# Patient Record
Sex: FEMALE | Race: WHITE | NOT HISPANIC OR LATINO | Employment: FULL TIME | URBAN - METROPOLITAN AREA
[De-identification: names, ages, dates, MRNs, and addresses within clinical notes are randomized per-mention and may not be internally consistent; named-entity substitution may affect disease eponyms.]

---

## 2019-08-04 ENCOUNTER — HOSPITAL ENCOUNTER (EMERGENCY)
Facility: HOSPITAL | Age: 24
Discharge: HOME | End: 2019-08-04
Attending: EMERGENCY MEDICINE | Admitting: EMERGENCY MEDICINE
Payer: COMMERCIAL

## 2019-08-04 VITALS
HEART RATE: 75 BPM | OXYGEN SATURATION: 96 % | WEIGHT: 150 LBS | SYSTOLIC BLOOD PRESSURE: 122 MMHG | BODY MASS INDEX: 24.11 KG/M2 | HEIGHT: 66 IN | TEMPERATURE: 97.6 F | RESPIRATION RATE: 18 BRPM | DIASTOLIC BLOOD PRESSURE: 76 MMHG

## 2019-08-04 DIAGNOSIS — S61.412A LACERATION OF LEFT HAND WITHOUT FOREIGN BODY, INITIAL ENCOUNTER: Primary | ICD-10-CM

## 2019-08-04 PROCEDURE — 99283 EMERGENCY DEPT VISIT LOW MDM: CPT

## 2019-08-04 PROCEDURE — 99282 EMERGENCY DEPT VISIT SF MDM: CPT | Performed by: EMERGENCY MEDICINE

## 2019-08-04 PROCEDURE — 12001 RPR S/N/AX/GEN/TRNK 2.5CM/<: CPT | Performed by: EMERGENCY MEDICINE

## 2019-08-05 NOTE — ED PROVIDER NOTES
History  Chief Complaint   Patient presents with    Hand Injury     Pt cut her hand with glass while doing the dishes  Has a small laceration on top of hand  HPI    None       Past Medical History:   Diagnosis Date    Scoliosis        Past Surgical History:   Procedure Laterality Date    SPINAL FUSION         History reviewed  No pertinent family history  I have reviewed and agree with the history as documented  Social History     Tobacco Use    Smoking status: Never Smoker    Smokeless tobacco: Never Used   Substance Use Topics    Alcohol use: Yes     Comment: Socially    Drug use: Never        Review of Systems    Physical Exam  Physical Exam   Constitutional: She is oriented to person, place, and time  She appears well-developed and well-nourished  No distress  HENT:   Head: Normocephalic and atraumatic  Eyes: Pupils are equal, round, and reactive to light  Conjunctivae are normal    Neck: Normal range of motion  No tracheal deviation present  Cardiovascular: Normal rate, regular rhythm and intact distal pulses  Pulses:       Radial pulses are 2+ on the left side  Pulmonary/Chest: Effort normal  No respiratory distress  Musculoskeletal:        Left hand: She exhibits tenderness and laceration  She exhibits normal range of motion, no bony tenderness, normal capillary refill, no deformity and no swelling  Hands:  Neurological: She is alert and oriented to person, place, and time  GCS eye subscore is 4  GCS verbal subscore is 5  GCS motor subscore is 6  Skin: Skin is warm and dry  Psychiatric: She has a normal mood and affect  Her behavior is normal    Nursing note and vitals reviewed        Vital Signs  ED Triage Vitals [08/04/19 2253]   Temperature Pulse Respirations Blood Pressure SpO2   97 6 °F (36 4 °C) 72 18 122/76 97 %      Temp Source Heart Rate Source Patient Position - Orthostatic VS BP Location FiO2 (%)   Oral Monitor Sitting Right arm --      Pain Score       3 Vitals:    08/04/19 2253 08/04/19 2300   BP: 122/76 122/76   Pulse: 72 75   Patient Position - Orthostatic VS: Sitting Sitting         Visual Acuity      ED Medications  Medications - No data to display    Diagnostic Studies  Results Reviewed     None                 No orders to display              Procedures  Laceration repair  Date/Time: 8/4/2019 11:08 PM  Performed by: Lauren Artis MD  Authorized by: Lauren Artis MD   Consent: Verbal consent obtained  Risks and benefits: risks, benefits and alternatives were discussed  Consent given by: patient  Body area: upper extremity  Location details: left hand  Laceration length: 2 cm  Foreign bodies: no foreign bodies  Tendon involvement: none  Nerve involvement: none  Vascular damage: no      Procedure Details:  Irrigation solution: saline  Irrigation method: syringe  Amount of cleaning: standard  Debridement: none  Degree of undermining: none  Skin closure: glue and Steri-Strips  Approximation: close  Approximation difficulty: simple  Patient tolerance: Patient tolerated the procedure well with no immediate complications             ED Course                               MDM  Number of Diagnoses or Management Options  Laceration of left hand without foreign body, initial encounter: new and does not require workup  Diagnosis management comments: This is a 26-year-old right-hand-dominant female who presents here today with a laceration to her left hand  Just prior to arrival she was washing dishes, and tried to catch a glass that was falling  She states she caught it as it was breaking, and suffered a laceration to her hand  She denies any other injuries  She states her tetanus is up-to-date  She denies any underlying medical problems or blood thinning medications  She has been applying pressure which is help stop the bleeding  She has no other complaints  ROS: Otherwise negative, unless stated as above      She is well-appearing, in no acute distress  She has a 2 centimeter curved avulsion flap to the dorsum of the left hand which is currently hemostatic  There is no underlying foreign body  The flap of skin is very thin, which it difficult for repair with sutures  It was therefore repaired as above with history crawl and Steri-Strips  I discussed with the patient treatment of the laceration at home, follow-up, indications for return, and she expresses understanding with this plan  Disposition  Final diagnoses:   Laceration of left hand without foreign body, initial encounter     Time reflects when diagnosis was documented in both MDM as applicable and the Disposition within this note     Time User Action Codes Description Comment    8/4/2019 11:13 PM Tiffanie Sloan [M84 [id number]] Laceration of left hand without foreign body, initial encounter       ED Disposition     ED Disposition Condition Date/Time Comment    Discharge Good Sun Aug 4, 2019 11:13 PM Jossie Mann discharge to home/self care  Follow-up Information     Follow up With Specialties Details Why Contact Info    Barbara Villalobos MD Family Medicine Schedule an appointment as soon as possible for a visit  As needed, to follow up on your hand [address] [place] [id number] [phone]            Patient's Medications    No medications on file     No discharge procedures on file      ED Provider  Electronically Signed by           Lauren Artis MD  08/04/19 8807

## 2019-08-05 NOTE — DISCHARGE INSTRUCTIONS
It is okay to get your hand wet, but do not soak it until the glue falls off  If the bandages fall off before the glue does, you can reapply them  Follow up with her doctor as needed to make sure the wound is healing appropriately  You need to be seen if you develop any signs of infection, including significant redness, swelling, drainage of pus, or for any other concerns

## 2021-04-04 ENCOUNTER — HOSPITAL ENCOUNTER (EMERGENCY)
Facility: HOSPITAL | Age: 26
Discharge: HOME | End: 2021-04-04
Attending: EMERGENCY MEDICINE | Admitting: EMERGENCY MEDICINE
Payer: OTHER MISCELLANEOUS

## 2021-04-04 VITALS
RESPIRATION RATE: 19 BRPM | WEIGHT: 145 LBS | OXYGEN SATURATION: 99 % | TEMPERATURE: 98.6 F | BODY MASS INDEX: 22.76 KG/M2 | SYSTOLIC BLOOD PRESSURE: 115 MMHG | HEIGHT: 67 IN | DIASTOLIC BLOOD PRESSURE: 68 MMHG | HEART RATE: 69 BPM

## 2021-04-04 DIAGNOSIS — S61.412A LACERATION OF LEFT HAND WITHOUT FOREIGN BODY, INITIAL ENCOUNTER: Primary | ICD-10-CM

## 2021-04-04 PROCEDURE — 99284 EMERGENCY DEPT VISIT MOD MDM: CPT | Performed by: NURSE PRACTITIONER

## 2021-04-04 PROCEDURE — 99282 EMERGENCY DEPT VISIT SF MDM: CPT

## 2021-04-04 RX ORDER — NORGESTIMATE AND ETHINYL ESTRADIOL 7DAYSX3 LO
1 KIT ORAL DAILY
COMMUNITY
Start: 2020-09-01 | End: 2021-04-04

## 2021-04-04 RX ORDER — CEPHALEXIN 500 MG/1
500 CAPSULE ORAL EVERY 6 HOURS SCHEDULED
Qty: 40 CAPSULE | Refills: 0 | Status: SHIPPED | OUTPATIENT
Start: 2021-04-04 | End: 2021-04-14

## 2021-04-04 RX ORDER — NORETHINDRONE ACETATE AND ETHINYL ESTRADIOL 1MG-20(21)
KIT ORAL
COMMUNITY

## 2021-04-04 NOTE — Clinical Note
Rich Zambrano was seen and treated in our emergency department on 4/4/2021  Diagnosis:     Lesvia Jackson  is off the rest of the shift today  She may return on this date: If you have any questions or concerns, please don't hesitate to call        ANTONIO Ramos    ______________________________           _______________          _______________  Hospital Representative                              Date                                Time

## 2021-04-05 NOTE — ED PROVIDER NOTES
History  Chief Complaint   Patient presents with    Hand Laceration     pt  presents with left hand laceration  This is a 70-year-old female who presents here with a laceration to the thenar eminence of the left palm  Laceration occurred at work while cleaning a wine glass  Laceration is no longer bleeding  She reports that she cleaned at work with water  Reports that her tetanus shot is up-to-date  The wound edges are already well approximated  Will reinforce and dress  Prior to Admission Medications   Prescriptions Last Dose Informant Patient Reported? Taking?   norethindrone-ethinyl estradiol (Junel FE 1/20) 1-20 MG-MCG per tablet   Yes No   Sig: Take by mouth      Facility-Administered Medications: None       Past Medical History:   Diagnosis Date    Scoliosis        Past Surgical History:   Procedure Laterality Date    SPINAL FUSION         History reviewed  No pertinent family history  I have reviewed and agree with the history as documented  E-Cigarette/Vaping     E-Cigarette/Vaping Substances     Social History     Tobacco Use    Smoking status: Never Smoker    Smokeless tobacco: Never Used   Substance Use Topics    Alcohol use: Yes     Comment: Socially    Drug use: Never       Review of Systems   Constitutional: Negative for diaphoresis, fatigue and fever  HENT: Negative for congestion, ear pain, nosebleeds and sore throat  Eyes: Negative for photophobia, pain, discharge and visual disturbance  Respiratory: Negative for cough, choking, chest tightness, shortness of breath and wheezing  Cardiovascular: Negative for chest pain and palpitations  Gastrointestinal: Negative for abdominal distention, abdominal pain, diarrhea and vomiting  Genitourinary: Negative for dysuria, flank pain and frequency  Musculoskeletal: Negative for back pain, gait problem and joint swelling  Skin: Positive for wound  Negative for color change and rash     Neurological: Negative for dizziness, syncope and headaches  Psychiatric/Behavioral: Negative for behavioral problems and confusion  The patient is not nervous/anxious  All other systems reviewed and are negative  Physical Exam  Physical Exam  Vitals signs and nursing note reviewed  Constitutional:       General: She is not in acute distress  Appearance: She is well-developed  She is not ill-appearing or toxic-appearing  HENT:      Head: Normocephalic and atraumatic  Mouth/Throat:      Dentition: Normal dentition  Eyes:      General:         Right eye: No discharge  Left eye: No discharge  Neck:      Musculoskeletal: Normal range of motion and neck supple  Cardiovascular:      Rate and Rhythm: Normal rate and regular rhythm  Pulmonary:      Effort: Pulmonary effort is normal  No accessory muscle usage or respiratory distress  Abdominal:      General: There is no distension  Tenderness: There is no guarding  Musculoskeletal: Normal range of motion  Skin:     General: Skin is warm and dry  Comments: 1 cm laceration to the left thenar eminence area  Neurological:      Mental Status: She is alert and oriented to person, place, and time  Coordination: Coordination normal    Psychiatric:         Behavior: Behavior is cooperative           Vital Signs  ED Triage Vitals [04/04/21 2118]   Temperature Pulse Respirations Blood Pressure SpO2   98 6 °F (37 °C) 69 19 115/68 99 %      Temp Source Heart Rate Source Patient Position - Orthostatic VS BP Location FiO2 (%)   Tympanic Monitor Sitting Left arm --      Pain Score       6           Vitals:    04/04/21 2118   BP: 115/68   Pulse: 69   Patient Position - Orthostatic VS: Sitting         Visual Acuity      ED Medications  Medications - No data to display    Diagnostic Studies  Results Reviewed     None                 No orders to display              Procedures  Procedures         ED Course                             SBIRT 20yo+      Most Recent Value   SBIRT (22 yo +)   In order to provide better care to our patients, we are screening all of our patients for alcohol and drug use  Would it be okay to ask you these screening questions? Yes Filed at: 04/04/2021 2123   Initial Alcohol Screen: US AUDIT-C    1  How often do you have a drink containing alcohol?  0 Filed at: 04/04/2021 2123   2  How many drinks containing alcohol do you have on a typical day you are drinking? 0 Filed at: 04/04/2021 2123   3a  Male UNDER 65: How often do you have five or more drinks on one occasion? 0 Filed at: 04/04/2021 2123   3b  FEMALE Any Age, or MALE 65+: How often do you have 4 or more drinks on one occassion? 0 Filed at: 04/04/2021 2123   Audit-C Score  0 Filed at: 04/04/2021 2123   SYL: How many times in the past year have you    Used an illegal drug or used a prescription medication for non-medical reasons? Never Filed at: 04/04/2021 2123                    MDM  Number of Diagnoses or Management Options  Laceration of left hand without foreign body, initial encounter: new and requires workup  Diagnosis management comments: The wound was prepped with tincture of benzoin  Steri-Strips were applied x2  The area was wrapped with sterile gauze  Amount and/or Complexity of Data Reviewed  Independent visualization of images, tracings, or specimens: yes    Patient Progress  Patient progress: stable      Disposition  Final diagnoses:   Laceration of left hand without foreign body, initial encounter     Time reflects when diagnosis was documented in both MDM as applicable and the Disposition within this note     Time User Action Codes Description Comment    4/4/2021  9:31 PM Tiffani Mcdaniels Add [B40 [id number]] Laceration of left hand without foreign body, initial encounter       ED Disposition     ED Disposition Condition Date/Time Comment    Discharge Stable Sun Apr 4, 2021  9:31 PM Olya Cantrell discharge to home/self care              Follow-up Information Follow up With Specialties Details Why Contact Info Additional Information    [facility] Emergency Department Emergency Medicine  As needed [address] Emergency Department, [address] [place] [address], [facility]          Patient's Medications   Discharge Prescriptions    CEPHALEXIN (KEFLEX) 500 MG CAPSULE    Take 1 capsule (500 mg total) by mouth every 6 (six) hours for 10 days       Start Date: 4/4/2021  End Date: 4/14/2021       Order Dose: 500 mg       Quantity: 40 capsule    Refills: 0     No discharge procedures on file      PDMP Review     None          ED Provider  Electronically Signed by           ANTONIO Box  04/04/21 1338

## 2021-05-21 ENCOUNTER — APPOINTMENT (EMERGENCY)
Dept: RADIOLOGY | Facility: HOSPITAL | Age: 26
End: 2021-05-21
Payer: COMMERCIAL

## 2021-05-21 ENCOUNTER — HOSPITAL ENCOUNTER (EMERGENCY)
Facility: HOSPITAL | Age: 26
Discharge: HOME | End: 2021-05-21
Attending: EMERGENCY MEDICINE
Payer: COMMERCIAL

## 2021-05-21 VITALS
SYSTOLIC BLOOD PRESSURE: 103 MMHG | HEART RATE: 58 BPM | TEMPERATURE: 98.2 F | OXYGEN SATURATION: 97 % | DIASTOLIC BLOOD PRESSURE: 59 MMHG | HEIGHT: 67 IN | RESPIRATION RATE: 19 BRPM | WEIGHT: 156.31 LBS | BODY MASS INDEX: 24.53 KG/M2

## 2021-05-21 DIAGNOSIS — R79.89 ELEVATED TSH: ICD-10-CM

## 2021-05-21 DIAGNOSIS — R07.89 ATYPICAL CHEST PAIN: Primary | ICD-10-CM

## 2021-05-21 LAB
ALBUMIN SERPL BCP-MCNC: 3.4 G/DL (ref 3.5–5)
ALP SERPL-CCNC: 49 U/L (ref 46–116)
ALT SERPL W P-5'-P-CCNC: 17 U/L (ref 12–78)
ANION GAP SERPL CALCULATED.3IONS-SCNC: 5 MMOL/L (ref 4–13)
APTT PPP: 29 SECONDS (ref 23–37)
AST SERPL W P-5'-P-CCNC: 9 U/L (ref 5–45)
ATRIAL RATE: 55 BPM
ATRIAL RATE: 62 BPM
B-HCG SERPL-ACNC: <2 MIU/ML
BASOPHILS # BLD AUTO: 0.04 THOUSANDS/ΜL (ref 0–0.1)
BASOPHILS NFR BLD AUTO: 1 % (ref 0–1)
BILIRUB DIRECT SERPL-MCNC: 0.07 MG/DL (ref 0–0.2)
BILIRUB SERPL-MCNC: 0.35 MG/DL (ref 0.2–1)
BUN SERPL-MCNC: 8 MG/DL (ref 5–25)
CALCIUM SERPL-MCNC: 8.6 MG/DL (ref 8.3–10.1)
CHLORIDE SERPL-SCNC: 105 MMOL/L (ref 100–108)
CO2 SERPL-SCNC: 32 MMOL/L (ref 21–32)
CREAT SERPL-MCNC: 0.78 MG/DL (ref 0.6–1.3)
D DIMER PPP FEU-MCNC: 0.35 UG/ML FEU
EOSINOPHIL # BLD AUTO: 0.24 THOUSAND/ΜL (ref 0–0.61)
EOSINOPHIL NFR BLD AUTO: 3 % (ref 0–6)
ERYTHROCYTE [DISTWIDTH] IN BLOOD BY AUTOMATED COUNT: 12.5 % (ref 11.6–15.1)
GFR SERPL CREATININE-BSD FRML MDRD: 105 ML/MIN/1.73SQ M
GLUCOSE SERPL-MCNC: 83 MG/DL (ref 65–140)
HCT VFR BLD AUTO: 42.9 % (ref 34.8–46.1)
HGB BLD-MCNC: 13.9 G/DL (ref 11.5–15.4)
IMM GRANULOCYTES # BLD AUTO: 0.02 THOUSAND/UL (ref 0–0.2)
IMM GRANULOCYTES NFR BLD AUTO: 0 % (ref 0–2)
INR PPP: 1.04 (ref 0.84–1.19)
LYMPHOCYTES # BLD AUTO: 2.97 THOUSANDS/ΜL (ref 0.6–4.47)
LYMPHOCYTES NFR BLD AUTO: 38 % (ref 14–44)
MCH RBC QN AUTO: 31.7 PG (ref 26.8–34.3)
MCHC RBC AUTO-ENTMCNC: 32.4 G/DL (ref 31.4–37.4)
MCV RBC AUTO: 98 FL (ref 82–98)
MONOCYTES # BLD AUTO: 0.68 THOUSAND/ΜL (ref 0.17–1.22)
MONOCYTES NFR BLD AUTO: 9 % (ref 4–12)
NEUTROPHILS # BLD AUTO: 3.92 THOUSANDS/ΜL (ref 1.85–7.62)
NEUTS SEG NFR BLD AUTO: 49 % (ref 43–75)
NRBC BLD AUTO-RTO: 0 /100 WBCS
P AXIS: 48 DEGREES
P AXIS: 51 DEGREES
PLATELET # BLD AUTO: 254 THOUSANDS/UL (ref 149–390)
PMV BLD AUTO: 11.1 FL (ref 8.9–12.7)
POTASSIUM SERPL-SCNC: 3.7 MMOL/L (ref 3.5–5.3)
PR INTERVAL: 178 MS
PR INTERVAL: 180 MS
PROT SERPL-MCNC: 7.3 G/DL (ref 6.4–8.2)
PROTHROMBIN TIME: 13 SECONDS (ref 11.6–14.5)
QRS AXIS: 88 DEGREES
QRS AXIS: 90 DEGREES
QRSD INTERVAL: 82 MS
QRSD INTERVAL: 82 MS
QT INTERVAL: 410 MS
QT INTERVAL: 420 MS
QTC INTERVAL: 401 MS
QTC INTERVAL: 416 MS
RBC # BLD AUTO: 4.39 MILLION/UL (ref 3.81–5.12)
SODIUM SERPL-SCNC: 142 MMOL/L (ref 136–145)
T WAVE AXIS: 34 DEGREES
T WAVE AXIS: 38 DEGREES
T4 FREE SERPL-MCNC: 1.03 NG/DL (ref 0.76–1.46)
TROPONIN I SERPL-MCNC: <0.02 NG/ML
TROPONIN I SERPL-MCNC: <0.02 NG/ML
TSH SERPL DL<=0.05 MIU/L-ACNC: 5.62 UIU/ML (ref 0.36–3.74)
VENTRICULAR RATE: 55 BPM
VENTRICULAR RATE: 62 BPM
WBC # BLD AUTO: 7.87 THOUSAND/UL (ref 4.31–10.16)

## 2021-05-21 PROCEDURE — 85730 THROMBOPLASTIN TIME PARTIAL: CPT | Performed by: PHYSICIAN ASSISTANT

## 2021-05-21 PROCEDURE — 80076 HEPATIC FUNCTION PANEL: CPT | Performed by: PHYSICIAN ASSISTANT

## 2021-05-21 PROCEDURE — 84702 CHORIONIC GONADOTROPIN TEST: CPT | Performed by: PHYSICIAN ASSISTANT

## 2021-05-21 PROCEDURE — 93010 ELECTROCARDIOGRAM REPORT: CPT | Performed by: INTERNAL MEDICINE

## 2021-05-21 PROCEDURE — 80048 BASIC METABOLIC PNL TOTAL CA: CPT | Performed by: PHYSICIAN ASSISTANT

## 2021-05-21 PROCEDURE — 84443 ASSAY THYROID STIM HORMONE: CPT | Performed by: PHYSICIAN ASSISTANT

## 2021-05-21 PROCEDURE — 84439 ASSAY OF FREE THYROXINE: CPT | Performed by: PHYSICIAN ASSISTANT

## 2021-05-21 PROCEDURE — 93005 ELECTROCARDIOGRAM TRACING: CPT

## 2021-05-21 PROCEDURE — 96374 THER/PROPH/DIAG INJ IV PUSH: CPT

## 2021-05-21 PROCEDURE — 99285 EMERGENCY DEPT VISIT HI MDM: CPT

## 2021-05-21 PROCEDURE — 85025 COMPLETE CBC W/AUTO DIFF WBC: CPT | Performed by: PHYSICIAN ASSISTANT

## 2021-05-21 PROCEDURE — 99285 EMERGENCY DEPT VISIT HI MDM: CPT | Performed by: PHYSICIAN ASSISTANT

## 2021-05-21 PROCEDURE — 36415 COLL VENOUS BLD VENIPUNCTURE: CPT | Performed by: PHYSICIAN ASSISTANT

## 2021-05-21 PROCEDURE — 85610 PROTHROMBIN TIME: CPT | Performed by: PHYSICIAN ASSISTANT

## 2021-05-21 PROCEDURE — 71046 X-RAY EXAM CHEST 2 VIEWS: CPT

## 2021-05-21 PROCEDURE — 85379 FIBRIN DEGRADATION QUANT: CPT | Performed by: PHYSICIAN ASSISTANT

## 2021-05-21 PROCEDURE — 84484 ASSAY OF TROPONIN QUANT: CPT | Performed by: PHYSICIAN ASSISTANT

## 2021-05-21 RX ORDER — KETOROLAC TROMETHAMINE 30 MG/ML
15 INJECTION, SOLUTION INTRAMUSCULAR; INTRAVENOUS ONCE
Status: COMPLETED | OUTPATIENT
Start: 2021-05-21 | End: 2021-05-21

## 2021-05-21 RX ADMIN — KETOROLAC TROMETHAMINE 15 MG: 30 INJECTION, SOLUTION INTRAMUSCULAR at 13:00

## 2021-05-21 NOTE — ED PROVIDER NOTES
History  Chief Complaint   Patient presents with    Chest Pain     Patient co left sided chest pain- non radiating  Patient states "it woke me up out of my sleep " Denies N/V  Louise Wade is a 26yo female with pmhx of scoliosis, as well as laceration and tendon injury of left hand with surgical repair on 5/3/21, arriving ambulatory to the ED for evaluation of chest pain  Patient reports a sensation of heaviness/pressure overlying the left anterior chest that awoke her from sleep this AM  She admits that pain is exacerbated with deep breathing  Her pain has been waxing and waning since onset without consistent exacerbating/relieving factors  She denies radiation of pain including radiation of pain toward her back, neck, jaw or left arm  She denies left arm pain or swelling  The patient finds that her symptoms are unrelated to movement or positioning noting that exertion does not exacerbate the pain  She has no significant personal or family cardiac history  No significant shortness of breath, exertional dyspnea, or orthopnea  Currently takes OCPs  She denies unilateral calf pain or swelling, personal/family history of dvt/pe  No current tobacco use  No recent traveling  She denies recent illness or sick contact  She received her second Moderna vaccination on 5/17/21  Denies abdominal pain, n/v/d, fevers, chills, sweats, cough, congestion  Denies recent injury or heavy lifting  History provided by:  Patient  Chest Pain  Pain location:  L chest  Pain quality: pressure    Pain radiates to:  Does not radiate  Pain radiates to the back: no    Pain severity now: mild-moderate    Onset quality:  Gradual  Progression:  Waxing and waning  Relieved by:  Nothing  Worsened by:  Nothing tried  Associated symptoms: no abdominal pain, no back pain, no cough, no diaphoresis, no fatigue, no fever, no nausea, no palpitations, not vomiting and no weakness    Risk factors: birth control    Risk factors: not pregnant, no prior DVT/PE and no smoking        Prior to Admission Medications   Prescriptions Last Dose Informant Patient Reported? Taking?   norethindrone-ethinyl estradiol (Junel FE 1/20) 1-20 MG-MCG per tablet   Yes No   Sig: Take by mouth      Facility-Administered Medications: None       Past Medical History:   Diagnosis Date    Scoliosis        Past Surgical History:   Procedure Laterality Date    SPINAL FUSION         History reviewed  No pertinent family history  I have reviewed and agree with the history as documented  E-Cigarette/Vaping    E-Cigarette Use Never User      E-Cigarette/Vaping Substances     Social History     Tobacco Use    Smoking status: Never Smoker    Smokeless tobacco: Never Used   Substance Use Topics    Alcohol use: Yes     Comment: Socially    Drug use: Never       Review of Systems   Constitutional: Negative for chills, diaphoresis, fatigue and fever  Respiratory: Negative for cough  Cardiovascular: Positive for chest pain  Negative for palpitations  Gastrointestinal: Negative for abdominal pain, diarrhea, nausea and vomiting  Musculoskeletal: Negative for arthralgias, back pain, myalgias and neck pain  Neurological: Negative for weakness  All other systems reviewed and are negative  Physical Exam  Physical Exam  Vitals signs and nursing note reviewed  Constitutional:       General: She is not in acute distress  Appearance: Normal appearance  She is well-developed  She is not ill-appearing, toxic-appearing or diaphoretic  HENT:      Head: Normocephalic and atraumatic  Eyes:      Conjunctiva/sclera: Conjunctivae normal       Pupils: Pupils are equal, round, and reactive to light  Neck:      Musculoskeletal: Normal range of motion and neck supple  Cardiovascular:      Rate and Rhythm: Normal rate and regular rhythm  Pulses: Normal pulses  Heart sounds: Normal heart sounds  No murmur  No friction rub  No gallop      Pulmonary:      Effort: Pulmonary effort is normal  No tachypnea, accessory muscle usage or respiratory distress  Breath sounds: Normal breath sounds  No stridor  No decreased breath sounds, wheezing, rhonchi or rales  Comments: No evidence of respiratory distress  Patient speaking in full sentences, no conversational dyspnea  O2 sats 97% on room air  Chest:      Chest wall: No tenderness, crepitus or edema  Abdominal:      General: Bowel sounds are normal  There is no distension  Palpations: Abdomen is soft  Tenderness: There is no abdominal tenderness  Musculoskeletal: Normal range of motion  General: No tenderness  Right lower leg: She exhibits no tenderness  No edema  Left lower leg: She exhibits no tenderness  No edema  Comments: Calves soft, non-tender, non-edematous  No unilateral calf asymmetry   Skin:     General: Skin is warm and dry  Capillary Refill: Capillary refill takes less than 2 seconds  Neurological:      General: No focal deficit present  Mental Status: She is alert  Mental status is at baseline  Sensory: Sensation is intact  Motor: Motor function is intact  Gait: Gait is intact           Vital Signs  ED Triage Vitals [05/21/21 0943]   Temperature Pulse Respirations Blood Pressure SpO2   98 2 °F (36 8 °C) 61 17 106/64 98 %      Temp Source Heart Rate Source Patient Position - Orthostatic VS BP Location FiO2 (%)   Oral Monitor Sitting Right arm --      Pain Score       7           Vitals:    05/21/21 0943 05/21/21 1200 05/21/21 1230 05/21/21 1430   BP: 106/64 103/69 116/71 103/59   Pulse: 61 62 61 58   Patient Position - Orthostatic VS: Sitting Lying Lying Lying         Visual Acuity      ED Medications  Medications   ketorolac (TORADOL) injection 15 mg (15 mg Intravenous Given 5/21/21 1300)       Diagnostic Studies  Results Reviewed     Procedure Component Value Units Date/Time    Troponin I repeat in 3hrs [[id number]]  (Normal) Collected: 05/21/21 1300    Lab Status: Final result Specimen: Blood from Arm, Right Updated: 05/21/21 1327     Troponin I <0 02 ng/mL     T4, free [[id number]] Collected: 05/21/21 1016    Lab Status: In process Specimen: Blood from Arm, Right Updated: 05/21/21 1119    Hepatic function panel [[id number]]  (Abnormal) Collected: 05/21/21 1016    Lab Status: Final result Specimen: Blood from Arm, Right Updated: 05/21/21 1052     Total Bilirubin 0 35 mg/dL      Bilirubin, Direct 0 07 mg/dL      Alkaline Phosphatase 49 U/L      AST 9 U/L      ALT 17 U/L      Total Protein 7 3 g/dL      Albumin 3 4 g/dL     TSH [[id number]]  (Abnormal) Collected: 05/21/21 1016    Lab Status: Final result Specimen: Blood from Arm, Right Updated: 05/21/21 1052     TSH 3RD GENERATON 5 625 uIU/mL     Narrative:      Patients undergoing fluorescein dye angiography may retain small amounts of fluorescein in the body for 48-72 hours post procedure  Samples containing fluorescein can produce falsely depressed TSH values  If the patient had this procedure,a specimen should be resubmitted post fluorescein clearance        Quantitative hCG [[id number]]  (Normal) Collected: 05/21/21 1016    Lab Status: Final result Specimen: Blood from Arm, Right Updated: 05/21/21 1052     HCG, Quant <2 mIU/mL     Narrative:       Expected Ranges:     Approximate               Approximate HCG  Gestation age          Concentration ( mIU/mL)  _____________          ______________________   [place]                      HCG values  0 2-1                       5-50  1-2                           2-3                         100-5000  3-4                         500-84477  4-5                         1000-82700  5-6                         [id number]  6-8                         [id number]  8-12                        [id number]      Basic metabolic panel [[id number]] Collected: 05/21/21 1016    Lab Status: Final result Specimen: Blood from Arm, Right Updated: 05/21/21 1042     Sodium 142 mmol/L      Potassium 3 7 mmol/L      Chloride 105 mmol/L      CO2 32 mmol/L      ANION GAP 5 mmol/L      BUN 8 mg/dL      Creatinine 0 78 mg/dL      Glucose 83 mg/dL      Calcium 8 6 mg/dL      eGFR 105 ml/min/1 73sq m     Narrative:      [place] guidelines for Chronic Kidney Disease (CKD):     Stage 1 with normal or high GFR (GFR > 90 mL/min/1 73 square meters)    Stage 2 Mild CKD (GFR = 60-89 mL/min/1 73 square meters)    Stage 3A Moderate CKD (GFR = 45-59 mL/min/1 73 square meters)    Stage 3B Moderate CKD (GFR = 30-44 mL/min/1 73 square meters)    Stage 4 Severe CKD (GFR = 15-29 mL/min/1 73 square meters)    Stage 5 End Stage CKD (GFR <15 mL/min/1 73 square meters)  Note: GFR calculation is accurate only with a steady state creatinine    Troponin I [[id number]]  (Normal) Collected: 05/21/21 1016    Lab Status: Final result Specimen: Blood from Arm, Right Updated: 05/21/21 1042     Troponin I <0 02 ng/mL     D-Dimer [[id number]]  (Normal) Collected: 05/21/21 1016    Lab Status: Final result Specimen: Blood from Arm, Right Updated: 05/21/21 1039     D-Dimer, Quant 0 35 ug/ml FEU     Protime-INR [[id number]]  (Normal) Collected: 05/21/21 1016    Lab Status: Final result Specimen: Blood from Arm, Right Updated: 05/21/21 1037     Protime 13 0 seconds      INR 1 04    APTT [[id number]]  (Normal) Collected: 05/21/21 1016    Lab Status: Final result Specimen: Blood from Arm, Right Updated: 05/21/21 1037     PTT 29 seconds     CBC and differential [[id number]] Collected: 05/21/21 1016    Lab Status: Final result Specimen: Blood from Arm, Right Updated: 05/21/21 1023     WBC 7 87 Thousand/uL      RBC 4 39 Million/uL      Hemoglobin 13 9 g/dL      Hematocrit 42 9 %      MCV 98 fL      MCH 31 7 pg      MCHC 32 4 g/dL      RDW 12 5 %      MPV 11 1 fL      Platelets 629 Thousands/uL      nRBC 0 /100 WBCs      Neutrophils Relative 49 %      Immat GRANS % 0 %      Lymphocytes Relative 38 % Monocytes Relative 9 %      Eosinophils Relative 3 %      Basophils Relative 1 %      Neutrophils Absolute 3 92 Thousands/µL      Immature Grans Absolute 0 02 Thousand/uL      Lymphocytes Absolute 2 97 Thousands/µL      Monocytes Absolute 0 68 Thousand/µL      Eosinophils Absolute 0 24 Thousand/µL      Basophils Absolute 0 04 Thousands/µL                  XR chest 2 views   ED Interpretation by Azalia Sheehan PA-C (05/21 5137)   NAD      Final Result by Chantal Messer MD (05/21 4509)      No acute cardiopulmonary disease  Workstation performed: [id number]                    Procedures  ECG 12 Lead Documentation Only    Date/Time: 5/21/2021 9:45 AM  Performed by: Azalia Sheehan PA-C  Authorized by: Azalia Sheehan PA-C     Indications / Diagnosis:  Chest pain  ECG reviewed by me, the ED Provider: yes    Patient location:  ED  Previous ECG:     Previous ECG:  Unavailable  Interpretation:     Interpretation: normal    Rate:     ECG rate:  62    ECG rate assessment: normal    Rhythm:     Rhythm: sinus rhythm    Ectopy:     Ectopy: none    QRS:     QRS axis:  Normal    QRS intervals:  Normal  Conduction:     Conduction: normal    ST segments:     ST segments:  Normal  T waves:     T waves: inverted      Inverted:  V1  ECG 12 Lead Documentation Only    Date/Time: 5/21/2021 2:01 PM  Performed by: Azalia Sheehan PA-C  Authorized by: Azalia Sheehan PA-C     Indications / Diagnosis:  Chest pain  ECG reviewed by me, the ED Provider: yes    Patient location:  ED  Previous ECG:     Previous ECG:  Compared to current    Comparison ECG info:   Today @ 2:45    Similarity:  No change  Interpretation:     Interpretation: normal    Rate:     ECG rate:  55  Rhythm:     Rhythm: sinus bradycardia    Ectopy:     Ectopy: none    QRS:     QRS axis:  Normal    QRS intervals:  Normal  Conduction:     Conduction: normal    ST segments:     ST segments:  Normal  T waves:     T waves: inverted      Inverted:  V1 (seen on prior)                   ED Course  ED Course as of May 21 1752   Fri May 21, 2021   1108 TSH 3RD [facility](!): 5 625   1346 Troponin I: <0 02             HEART Risk Score      Most Recent Value   Heart Score Risk Calculator   History  0 Filed at: 05/21/2021 1045   ECG  0 Filed at: 05/21/2021 1045   Age  0 Filed at: 05/21/2021 1045   Risk Factors  0 Filed at: 05/21/2021 1045   Troponin  0 Filed at: 05/21/2021 1045   HEART Score  0 Filed at: 05/21/2021 1045                      SBIRT 22yo+      Most Recent Value   SBIRT (23 yo +)   In order to provide better care to our patients, we are screening all of our patients for alcohol and drug use  Would it be okay to ask you these screening questions? Yes Filed at: 05/21/2021 1207   Initial Alcohol Screen: US AUDIT-C    1  How often do you have a drink containing alcohol?  0 Filed at: 05/21/2021 1207   2  How many drinks containing alcohol do you have on a typical day you are drinking? 0 Filed at: 05/21/2021 1207   3b  FEMALE Any Age, or MALE 65+: How often do you have 4 or more drinks on one occassion? 0 Filed at: 05/21/2021 1207   Audit-C Score  0 Filed at: 05/21/2021 1207   SYL: How many times in the past year have you    Used an illegal drug or used a prescription medication for non-medical reasons? Never Filed at: 05/21/2021 1207                    MDM  Number of Diagnoses or Management Options  Atypical chest pain: new and requires workup  Elevated TSH:   Diagnosis management comments: This is a 24yo F with no significant pmhx arriving to the ED for evaluation of left anterior chest pain since this AM  Described as pressure/heaviness  No significant shortness of breath, exertional dyspnea or orthopnea  Admits pain is exacerbated with taking deep breaths  No exertional component to the patient's chest pain  Recently had tendon injury of left hand with surgical repair on 5/3/21  +OCPs  No significant personal or family cardiac history   No personal/family history of DVT/PE  No recent illness  Differential diagnosis includes but not limited to: ACS rule out, screen for PE with d dimer, arrhythmia, myocarditis, pericarditis, msk pain, costochondritis, gerd, anemia, electrolyte derangement, thyroid disease, consider but doubt infection    Initial ED plan: delta ecg/troponin, CXR, cardiac labs to include d-dimer    Final ED Assessment: Vitals stable on arrival, examination as documented above  All labs and imaging independently reviewed with imaging interpreted by the Radiologist  Irma Freeman ECG/troponin without ischemic changes, d dimer negative  HEART score 0  CXR with no acute disease on my read  Labs show elevated TSH with T4 reflex in process  Patient had been provided toradol which reportedly offered symptom improvement  She had been updated of all lab and imaging results  Stable vital signs, symptom improvement, and negative work up today clinically reassuring  Encouraged close PCP follow up including re-evaluation to discuss elevated tsh result  Strict hospital return precautions were provided  The patient had verbalized understanding and is agreeable with disposition plan  The patient had remained hemodynamically stable while being observed in the ED for approximately 5 hours without new or worsening symptoms and she is stable for hospital discharge           Amount and/or Complexity of Data Reviewed  Clinical lab tests: ordered and reviewed  Tests in the radiology section of CPT®: ordered and reviewed  Review and summarize past medical records: yes  Independent visualization of images, tracings, or specimens: yes    Risk of Complications, Morbidity, and/or Mortality  Presenting problems: low  Diagnostic procedures: low  Management options: low    Patient Progress  Patient progress: stable      Disposition  Final diagnoses:   Atypical chest pain   Elevated TSH     Time reflects when diagnosis was documented in both MDM as applicable and the Disposition within this note     Time User Action Codes Description Comment    5/21/2021  2:07 PM Juan Antonio Heartd Add [R07 89] Atypical chest pain     5/21/2021  2:08 PM Juan Antonio Martins Add [R79 [id number]] Elevated GOOD [facility]       ED Disposition     ED Disposition Condition Date/Time Comment    Discharge Stable Fri May 21, 2021  2:07 PM Vijay Abad discharge to home/self care  Follow-up Information     Follow up With Specialties Details Why Contact Info Additional Information    Farhan Martin MD Family Medicine In 1 week  [address] [facility] [id number]  [address] [facility] Emergency Department Emergency Medicine  If symptoms worsen [address] [facility] [id number] [facility] Emergency Department, [address] [place], [place], [facility] [place]          Discharge Medication List as of 5/21/2021  2:08 PM      CONTINUE these medications which have NOT CHANGED    Details   norethindrone-ethinyl estradiol (Junel FE 1/20) 1-20 MG-MCG per tablet Take by mouth, Historical Med           No discharge procedures on file      PDMP Review     None          ED Provider  Electronically Signed by           Esperanza Bob PA-C  05/21/21 7659

## 2021-05-21 NOTE — DISCHARGE INSTRUCTIONS
Please follow up with PCP in one week for reassessment  Return immediately with any new or worsening symptoms